# Patient Record
Sex: MALE | Race: BLACK OR AFRICAN AMERICAN | NOT HISPANIC OR LATINO | Employment: OTHER | URBAN - METROPOLITAN AREA
[De-identification: names, ages, dates, MRNs, and addresses within clinical notes are randomized per-mention and may not be internally consistent; named-entity substitution may affect disease eponyms.]

---

## 2020-03-11 ENCOUNTER — OFFICE VISIT (OUTPATIENT)
Dept: UROLOGY | Facility: CLINIC | Age: 75
End: 2020-03-11
Payer: MEDICARE

## 2020-03-11 VITALS
DIASTOLIC BLOOD PRESSURE: 70 MMHG | BODY MASS INDEX: 25.48 KG/M2 | HEIGHT: 69 IN | SYSTOLIC BLOOD PRESSURE: 132 MMHG | WEIGHT: 172 LBS | HEART RATE: 79 BPM

## 2020-03-11 DIAGNOSIS — C61 PROSTATE CANCER: Primary | ICD-10-CM

## 2020-03-11 LAB — PSA, DIAGNOSTIC: <0.014 NG/ML (ref 0–4)

## 2020-03-11 PROCEDURE — 99213 OFFICE O/P EST LOW 20 MIN: CPT | Mod: S$GLB,,, | Performed by: NURSE PRACTITIONER

## 2020-03-11 PROCEDURE — 99213 PR OFFICE/OUTPT VISIT, EST, LEVL III, 20-29 MIN: ICD-10-PCS | Mod: S$GLB,,, | Performed by: NURSE PRACTITIONER

## 2020-03-11 RX ORDER — LISINOPRIL AND HYDROCHLOROTHIAZIDE 20; 25 MG/1; MG/1
TABLET ORAL
COMMUNITY
Start: 2020-02-13 | End: 2022-05-04

## 2020-03-11 RX ORDER — AMLODIPINE BESYLATE 10 MG/1
TABLET ORAL
COMMUNITY
Start: 2020-03-10 | End: 2022-05-04

## 2020-03-11 RX ORDER — SIMVASTATIN 80 MG/1
TABLET, FILM COATED ORAL
COMMUNITY
Start: 2019-12-31 | End: 2022-05-04

## 2020-03-11 NOTE — PROGRESS NOTES
Subjective:       Patient ID: Jayson Diaz is a 74 y.o. male.    Chief Complaint: Other (yearly )      HPI: Thin 74-year-old male known service of Dr. Chery with a history of a distant radical prostatectomy followed by radiation therapy.  His PSAs have been stable at 0.0 with a most recent PSA drawn 2019.  From urologic standpoint he has no complaints.  He is no longer using Tri Mix as his wife passed away 3 years ago.  But states should become involved in the future he would like to resume.  Denies dysuria, frequency, urgency, incontinence, gross hematuria.  No unexplained weight loss or new bone pain       Past Medical History:   Past Medical History:   Diagnosis Date    ED (erectile dysfunction)     Hypertension     Kidney stone     Prostate cancer        Past Surgical Historical:   Past Surgical History:   Procedure Laterality Date    HERNIA REPAIR      RADICAL PROSTATECTOMY          Medications:   Medication List with Changes/Refills   Current Medications    AMLODIPINE (NORVASC) 10 MG TABLET        CLOPIDOGREL BISULFATE (CLOPIDOGREL ORAL)    Take 80 mg by mouth.    LISINOPRIL-HYDROCHLOROTHIAZIDE (PRINZIDE,ZESTORETIC) 20-25 MG TAB    TK 1 T PO D    SIMVASTATIN (ZOCOR) 80 MG TABLET    TK 1 T PO QD        Past Social History:   Social History     Socioeconomic History    Marital status:      Spouse name: Not on file    Number of children: Not on file    Years of education: Not on file    Highest education level: Not on file   Occupational History    Not on file   Social Needs    Financial resource strain: Not on file    Food insecurity:     Worry: Not on file     Inability: Not on file    Transportation needs:     Medical: Not on file     Non-medical: Not on file   Tobacco Use    Smoking status: Current Every Day Smoker     Packs/day: 1.50     Types: Cigarettes   Substance and Sexual Activity    Alcohol use: Not Currently    Drug use: Never    Sexual activity: Not on file   Lifestyle     Physical activity:     Days per week: Not on file     Minutes per session: Not on file    Stress: Not on file   Relationships    Social connections:     Talks on phone: Not on file     Gets together: Not on file     Attends Taoism service: Not on file     Active member of club or organization: Not on file     Attends meetings of clubs or organizations: Not on file     Relationship status: Not on file   Other Topics Concern    Not on file   Social History Narrative    Not on file       Allergies: Review of patient's allergies indicates:  No Known Allergies     Family History: History reviewed. No pertinent family history.     Review of Systems:  Review of Systems   Constitutional: Negative for activity change and appetite change.   HENT: Negative for congestion and dental problem.    Respiratory: Negative for chest tightness and shortness of breath.    Cardiovascular: Negative for chest pain.   Gastrointestinal: Negative for abdominal distention and abdominal pain.   Genitourinary: Negative for decreased urine volume, difficulty urinating, discharge, dysuria, enuresis, flank pain, frequency, genital sores, hematuria, penile pain, penile swelling, scrotal swelling, testicular pain and urgency.   Musculoskeletal: Negative for back pain and neck pain.   Neurological: Negative for dizziness.   Hematological: Negative for adenopathy.   Psychiatric/Behavioral: Negative for agitation, behavioral problems and confusion.       Physical Exam:  Physical Exam   Constitutional: He is oriented to person, place, and time. He appears well-developed and well-nourished.   HENT:   Head: Normocephalic.   Eyes: No scleral icterus.   Neck: Normal range of motion.   Cardiovascular: Intact distal pulses.    Pulmonary/Chest: Effort normal and breath sounds normal.   Abdominal: Soft. He exhibits no distension. There is no tenderness. No hernia. Hernia confirmed negative in the right inguinal area and confirmed negative in the left  inguinal area.   Genitourinary: Testes normal and penis normal. Cremasteric reflex is present.   Neurological: He is alert and oriented to person, place, and time.   Skin: Skin is warm and dry.     Psychiatric: He has a normal mood and affect.          assessment plan;  Prostate cancer--status post distant radical prostatectomy followed by radiation.  PSA has been stable at 0.0 most recent PSA early 2019.  Recheck PSA today    Erectile dysfunction--has use Tri Mix in the past with a successful outcome.  Currently not on medication secondary to not sexually active.

## 2021-03-16 ENCOUNTER — OFFICE VISIT (OUTPATIENT)
Dept: UROLOGY | Facility: CLINIC | Age: 76
End: 2021-03-16
Payer: MEDICARE

## 2021-03-16 VITALS — SYSTOLIC BLOOD PRESSURE: 164 MMHG | HEART RATE: 65 BPM | DIASTOLIC BLOOD PRESSURE: 70 MMHG

## 2021-03-16 DIAGNOSIS — C61 PROSTATE CANCER: Primary | ICD-10-CM

## 2021-03-16 DIAGNOSIS — R31.9 HEMATURIA, UNSPECIFIED TYPE: ICD-10-CM

## 2021-03-16 LAB — PSA, DIAGNOSTIC: <0.014 NG/ML (ref 0–4)

## 2021-03-16 PROCEDURE — 99213 OFFICE O/P EST LOW 20 MIN: CPT | Mod: S$GLB,,, | Performed by: UROLOGY

## 2021-03-16 PROCEDURE — 99213 PR OFFICE/OUTPT VISIT, EST, LEVL III, 20-29 MIN: ICD-10-PCS | Mod: S$GLB,,, | Performed by: UROLOGY

## 2021-03-18 ENCOUNTER — TELEPHONE (OUTPATIENT)
Dept: UROLOGY | Facility: CLINIC | Age: 76
End: 2021-03-18

## 2021-03-24 ENCOUNTER — PROCEDURE VISIT (OUTPATIENT)
Dept: UROLOGY | Facility: CLINIC | Age: 76
End: 2021-03-24
Payer: MEDICARE

## 2021-03-24 VITALS
OXYGEN SATURATION: 98 % | HEIGHT: 69 IN | BODY MASS INDEX: 25.79 KG/M2 | SYSTOLIC BLOOD PRESSURE: 182 MMHG | RESPIRATION RATE: 18 BRPM | DIASTOLIC BLOOD PRESSURE: 79 MMHG | HEART RATE: 77 BPM | WEIGHT: 174.13 LBS

## 2021-03-24 DIAGNOSIS — C61 PROSTATE CANCER: Primary | ICD-10-CM

## 2021-03-24 DIAGNOSIS — R31.9 HEMATURIA, UNSPECIFIED TYPE: ICD-10-CM

## 2021-03-24 PROCEDURE — 52000 CYSTOURETHROSCOPY: CPT | Mod: S$GLB,,, | Performed by: UROLOGY

## 2021-03-24 PROCEDURE — 52000 CYSTOSCOPY: ICD-10-PCS | Mod: S$GLB,,, | Performed by: UROLOGY

## 2021-03-24 RX ORDER — CIPROFLOXACIN 500 MG/1
500 TABLET ORAL
Status: COMPLETED | OUTPATIENT
Start: 2021-03-24 | End: 2021-03-24

## 2021-03-24 RX ADMIN — CIPROFLOXACIN 500 MG: 500 TABLET ORAL at 01:03

## 2021-04-07 ENCOUNTER — OFFICE VISIT (OUTPATIENT)
Dept: UROLOGY | Facility: CLINIC | Age: 76
End: 2021-04-07
Payer: MEDICARE

## 2021-04-07 VITALS — RESPIRATION RATE: 18 BRPM | DIASTOLIC BLOOD PRESSURE: 72 MMHG | SYSTOLIC BLOOD PRESSURE: 164 MMHG | HEART RATE: 62 BPM

## 2021-04-07 DIAGNOSIS — R31.29 HEMATURIA, MICROSCOPIC: ICD-10-CM

## 2021-04-07 DIAGNOSIS — C61 PROSTATE CANCER: Primary | ICD-10-CM

## 2021-04-07 DIAGNOSIS — N30.40 RADIATION CYSTITIS: ICD-10-CM

## 2021-04-07 PROCEDURE — 99213 PR OFFICE/OUTPT VISIT, EST, LEVL III, 20-29 MIN: ICD-10-PCS | Mod: S$GLB,,, | Performed by: NURSE PRACTITIONER

## 2021-04-07 PROCEDURE — 99213 OFFICE O/P EST LOW 20 MIN: CPT | Mod: S$GLB,,, | Performed by: NURSE PRACTITIONER

## 2022-01-19 ENCOUNTER — HOSPITAL ENCOUNTER (OUTPATIENT)
Dept: MEDSURG UNIT | Facility: HOSPITAL | Age: 77
End: 2022-01-21
Attending: INTERNAL MEDICINE | Admitting: INTERNAL MEDICINE

## 2022-01-19 LAB — CREAT SERPL-MCNC: 2.22 MG/DL (ref 0.73–1.18)

## 2022-01-20 LAB
ABS NEUT (OLG): 4.23 X10(3)/MCL (ref 2.1–9.2)
ABS NEUT (OLG): 5.51 X10(3)/MCL (ref 2.1–9.2)
ALBUMIN SERPL-MCNC: 3.3 GM/DL (ref 3.4–4.8)
ALBUMIN/GLOB SERPL: 1.5 RATIO (ref 1.1–2)
ALP SERPL-CCNC: 68 UNIT/L (ref 40–150)
ALT SERPL-CCNC: 10 UNIT/L (ref 0–55)
AST SERPL-CCNC: 13 UNIT/L (ref 5–34)
BASOPHILS # BLD AUTO: 0 X10(3)/MCL (ref 0–0.2)
BASOPHILS # BLD AUTO: 0 X10(3)/MCL (ref 0–0.2)
BASOPHILS NFR BLD AUTO: 0 %
BASOPHILS NFR BLD AUTO: 0 %
BILIRUB SERPL-MCNC: 0.4 MG/DL
BILIRUBIN DIRECT+TOT PNL SERPL-MCNC: 0.2 MG/DL (ref 0–0.5)
BILIRUBIN DIRECT+TOT PNL SERPL-MCNC: 0.2 MG/DL (ref 0–0.8)
BUN SERPL-MCNC: 34.9 MG/DL (ref 8.4–25.7)
CALCIUM SERPL-MCNC: 8.5 MG/DL (ref 8.7–10.5)
CHLORIDE SERPL-SCNC: 109 MMOL/L (ref 98–107)
CO2 SERPL-SCNC: 22 MMOL/L (ref 23–31)
CREAT SERPL-MCNC: 2.12 MG/DL (ref 0.73–1.18)
EOSINOPHIL # BLD AUTO: 0.2 X10(3)/MCL (ref 0–0.9)
EOSINOPHIL # BLD AUTO: 0.2 X10(3)/MCL (ref 0–0.9)
EOSINOPHIL NFR BLD AUTO: 2 %
EOSINOPHIL NFR BLD AUTO: 3 %
ERYTHROCYTE [DISTWIDTH] IN BLOOD BY AUTOMATED COUNT: 13.2 % (ref 11.5–17)
ERYTHROCYTE [DISTWIDTH] IN BLOOD BY AUTOMATED COUNT: 13.4 % (ref 11.5–17)
GLOBULIN SER-MCNC: 2.2 GM/DL (ref 2.4–3.5)
GLUCOSE SERPL-MCNC: 172 MG/DL (ref 82–115)
HCT VFR BLD AUTO: 31.6 % (ref 42–52)
HCT VFR BLD AUTO: 31.7 % (ref 42–52)
HGB BLD-MCNC: 10 GM/DL (ref 14–18)
HGB BLD-MCNC: 10.2 GM/DL (ref 14–18)
LYMPHOCYTES # BLD AUTO: 0.9 X10(3)/MCL (ref 0.6–4.6)
LYMPHOCYTES # BLD AUTO: 1 X10(3)/MCL (ref 0.6–4.6)
LYMPHOCYTES NFR BLD AUTO: 13 %
LYMPHOCYTES NFR BLD AUTO: 16 %
MCH RBC QN AUTO: 30.9 PG (ref 27–31)
MCH RBC QN AUTO: 31.1 PG (ref 27–31)
MCHC RBC AUTO-ENTMCNC: 31.5 GM/DL (ref 33–36)
MCHC RBC AUTO-ENTMCNC: 32.3 GM/DL (ref 33–36)
MCV RBC AUTO: 96.3 FL (ref 80–94)
MCV RBC AUTO: 97.8 FL (ref 80–94)
MONOCYTES # BLD AUTO: 0.7 X10(3)/MCL (ref 0.1–1.3)
MONOCYTES # BLD AUTO: 0.8 X10(3)/MCL (ref 0.1–1.3)
MONOCYTES NFR BLD AUTO: 12 %
MONOCYTES NFR BLD AUTO: 9 %
NEUTROPHILS # BLD AUTO: 4.23 X10(3)/MCL (ref 2.1–9.2)
NEUTROPHILS # BLD AUTO: 5.51 X10(3)/MCL (ref 2.1–9.2)
NEUTROPHILS NFR BLD AUTO: 68 %
NEUTROPHILS NFR BLD AUTO: 75 %
PLATELET # BLD AUTO: 162 X10(3)/MCL (ref 130–400)
PLATELET # BLD AUTO: 174 X10(3)/MCL (ref 130–400)
PMV BLD AUTO: 10.3 FL (ref 9.4–12.4)
PMV BLD AUTO: 9.9 FL (ref 9.4–12.4)
POTASSIUM SERPL-SCNC: 4.4 MMOL/L (ref 3.5–5.1)
PROT SERPL-MCNC: 5.5 GM/DL (ref 5.8–7.6)
RBC # BLD AUTO: 3.24 X10(6)/MCL (ref 4.7–6.1)
RBC # BLD AUTO: 3.28 X10(6)/MCL (ref 4.7–6.1)
SODIUM SERPL-SCNC: 138 MMOL/L (ref 136–145)
WBC # SPEC AUTO: 6.2 X10(3)/MCL (ref 4.5–11.5)
WBC # SPEC AUTO: 7.4 X10(3)/MCL (ref 4.5–11.5)

## 2022-05-04 ENCOUNTER — OFFICE VISIT (OUTPATIENT)
Dept: UROLOGY | Facility: CLINIC | Age: 77
End: 2022-05-04
Payer: MEDICARE

## 2022-05-04 VITALS
WEIGHT: 184 LBS | SYSTOLIC BLOOD PRESSURE: 143 MMHG | HEART RATE: 68 BPM | HEIGHT: 69 IN | DIASTOLIC BLOOD PRESSURE: 75 MMHG | BODY MASS INDEX: 27.25 KG/M2

## 2022-05-04 DIAGNOSIS — C61 PROSTATE CANCER: Primary | ICD-10-CM

## 2022-05-04 LAB — PSA, DIAGNOSTIC: <0.014 NG/ML (ref 0–4)

## 2022-05-04 PROCEDURE — 99214 PR OFFICE/OUTPT VISIT, EST, LEVL IV, 30-39 MIN: ICD-10-PCS | Mod: S$GLB,,, | Performed by: NURSE PRACTITIONER

## 2022-05-04 PROCEDURE — 99214 OFFICE O/P EST MOD 30 MIN: CPT | Mod: S$GLB,,, | Performed by: NURSE PRACTITIONER

## 2022-05-04 RX ORDER — LABETALOL 200 MG/1
400 TABLET, FILM COATED ORAL 2 TIMES DAILY
COMMUNITY
Start: 2022-04-02

## 2022-05-04 RX ORDER — TRIAMCINOLONE ACETONIDE 1 MG/G
CREAM TOPICAL
COMMUNITY
Start: 2022-04-07

## 2022-05-04 RX ORDER — ASPIRIN 81 MG/1
81 TABLET ORAL
COMMUNITY
Start: 2022-01-19

## 2022-05-04 RX ORDER — OMEPRAZOLE 40 MG/1
CAPSULE, DELAYED RELEASE ORAL
COMMUNITY
Start: 2022-02-21

## 2022-05-04 RX ORDER — CLONIDINE HYDROCHLORIDE 0.1 MG/1
TABLET ORAL
COMMUNITY
Start: 2022-05-02

## 2022-05-04 RX ORDER — ATORVASTATIN CALCIUM 80 MG/1
80 TABLET, FILM COATED ORAL DAILY
COMMUNITY
Start: 2022-02-26

## 2022-05-04 RX ORDER — LISINOPRIL 10 MG/1
10 TABLET ORAL EVERY MORNING
COMMUNITY
Start: 2022-03-30

## 2022-05-04 NOTE — PROGRESS NOTES
Subjective:       Patient ID: Jayson Diaz is a 76 y.o. male.    Chief Complaint: Prostate Cancer      HPI: 76-year-old male known to the service past patient Dr. Chery history of prostate cancer remotely status post prostatectomy with follow-up XRT.  Last year in March of 2021 he was on blood thinners had gross hematuria.  He underwent cystoscopy that time identifying findings compatible with radiation cystitis.  Patient came off the blood thinners and has not seen any further bleeding.  He is voiding without complaint.  Denies any urologic issues at present.  He does have a history of erectile dysfunction is use Tri Mix in the past.  Has been approximately 5 years since his last use.  His wife passed away at that time.  He questions today if he can restart the medicine should the need arise.  PSA has been stable at 0.0       Past Medical History:   Past Medical History:   Diagnosis Date    ED (erectile dysfunction)     Hypertension     Kidney stone     Prostate cancer        Past Surgical Historical:   Past Surgical History:   Procedure Laterality Date    HERNIA REPAIR      RADICAL PROSTATECTOMY          Medications:   Medication List with Changes/Refills   Current Medications    ASPIRIN (ECOTRIN) 81 MG EC TABLET    Take 81 mg by mouth.    ATORVASTATIN (LIPITOR) 80 MG TABLET    Take 80 mg by mouth once daily.    CLONIDINE (CATAPRES) 0.1 MG TABLET    TAKE 1 TABLET BY MOUTH TWICE DAILY AS NEEDED FOR BLOOD PRESSURE GREATER THAN 170/100    LABETALOL (NORMODYNE) 200 MG TABLET    Take 400 mg by mouth 2 (two) times daily.    LISINOPRIL 10 MG TABLET    Take 10 mg by mouth every morning.    OMEPRAZOLE (PRILOSEC) 40 MG CAPSULE    TAKE 1 CAPSULE BY MOUTH EVERY MORNING FOR REFLUX    TRIAMCINOLONE ACETONIDE 0.1% (KENALOG) 0.1 % CREAM    APPLY SMALL AMOUNT TOPICALLY TO THE AFFECTED AREA EVERY DAY AS NEEDED FOR ITCHING   Discontinued Medications    AMLODIPINE (NORVASC) 10 MG TABLET        CLOPIDOGREL BISULFATE  (CLOPIDOGREL ORAL)    Take 80 mg by mouth.    LISINOPRIL-HYDROCHLOROTHIAZIDE (PRINZIDE,ZESTORETIC) 20-25 MG TAB    TK 1 T PO D    SIMVASTATIN (ZOCOR) 80 MG TABLET    TK 1 T PO QD        Past Social History:   Social History     Socioeconomic History    Marital status:    Tobacco Use    Smoking status: Current Every Day Smoker     Packs/day: 1.50     Types: Cigarettes   Substance and Sexual Activity    Alcohol use: Not Currently    Drug use: Never       Allergies: Review of patient's allergies indicates:  No Known Allergies     Family History: History reviewed. No pertinent family history.     Review of Systems:  Review of Systems   Constitutional: Negative for activity change and appetite change.   HENT: Negative for congestion and dental problem.    Eyes: Negative for visual disturbance.   Respiratory: Negative for chest tightness and shortness of breath.    Cardiovascular: Negative for chest pain.   Gastrointestinal: Negative for abdominal distention and abdominal pain.   Genitourinary: Negative for decreased urine volume, difficulty urinating, dysuria, enuresis, flank pain, frequency, genital sores, hematuria, penile discharge, penile pain, penile swelling, scrotal swelling, testicular pain and urgency.   Musculoskeletal: Negative for back pain and neck pain.   Skin: Negative for color change.   Neurological: Negative for dizziness.   Hematological: Negative for adenopathy.   Psychiatric/Behavioral: Negative for agitation, behavioral problems and confusion.       Physical Exam:  Physical Exam  Constitutional:       Appearance: He is well-developed.   HENT:      Head: Normocephalic.   Eyes:      General: No scleral icterus.  Pulmonary:      Effort: Pulmonary effort is normal.      Breath sounds: Normal breath sounds.   Abdominal:      General: There is no distension.      Palpations: Abdomen is soft.      Tenderness: There is no abdominal tenderness.      Hernia: No hernia is present. There is no hernia  in the right inguinal area or left inguinal area.   Genitourinary:     Penis: Normal.       Testes: Normal. Cremasteric reflex is present.   Musculoskeletal:      Cervical back: Normal range of motion.   Skin:     General: Skin is warm and dry.   Neurological:      Mental Status: He is alert and oriented to person, place, and time.         Assessment/Plan:   Prostate cancer--urinalysis negative.  Serum PSA today    History radiation cystitis--no recurrence of gross hematuria status post cessation of anticoagulants    Erectile dysfunction--patient would like to restart Tri Mix in the future should the need arise I told him at that time this give me a call and will send a prescription in he verbalized understanding of same he did not want script today.  Problem List Items Addressed This Visit    None

## 2022-05-05 ENCOUNTER — TELEPHONE (OUTPATIENT)
Dept: UROLOGY | Facility: CLINIC | Age: 77
End: 2022-05-05
Payer: MEDICARE

## 2022-05-05 NOTE — TELEPHONE ENCOUNTER
Patient notified of lab results. [facility] LPN----- Message from Chadwick Venegas NP sent at 5/5/2022  7:37 AM CDT -----  Please notify patient of lab results within stable range

## 2023-05-04 ENCOUNTER — OFFICE VISIT (OUTPATIENT)
Dept: UROLOGY | Facility: CLINIC | Age: 78
End: 2023-05-04
Payer: MEDICARE

## 2023-05-04 VITALS — SYSTOLIC BLOOD PRESSURE: 197 MMHG | HEART RATE: 67 BPM | DIASTOLIC BLOOD PRESSURE: 88 MMHG

## 2023-05-04 DIAGNOSIS — R39.15 URINARY URGENCY: ICD-10-CM

## 2023-05-04 DIAGNOSIS — C61 PROSTATE CANCER: Primary | ICD-10-CM

## 2023-05-04 LAB — PSA, DIAGNOSTIC: < 0.01 NG/ML (ref 0.1–4)

## 2023-05-04 PROCEDURE — 99214 OFFICE O/P EST MOD 30 MIN: CPT | Mod: S$GLB,,, | Performed by: UROLOGY

## 2023-05-04 PROCEDURE — 99214 PR OFFICE/OUTPT VISIT, EST, LEVL IV, 30-39 MIN: ICD-10-PCS | Mod: S$GLB,,, | Performed by: UROLOGY

## 2023-05-04 RX ORDER — SOLIFENACIN SUCCINATE 5 MG/1
5 TABLET, FILM COATED ORAL DAILY
Qty: 30 TABLET | Refills: 11 | Status: SHIPPED | OUTPATIENT
Start: 2023-05-04 | End: 2024-05-03

## 2023-05-04 RX ORDER — AMLODIPINE BESYLATE 10 MG/1
TABLET ORAL
COMMUNITY

## 2023-05-04 RX ORDER — GLIMEPIRIDE 1 MG/1
TABLET ORAL
COMMUNITY

## 2023-05-04 RX ORDER — EZETIMIBE 10 MG/1
10 TABLET ORAL
COMMUNITY
Start: 2023-02-27

## 2023-05-04 NOTE — PROGRESS NOTES
Subjective:       Patient ID: Jayson Diaz is a 77 y.o. male.    Chief Complaint: No chief complaint on file.      HPI:  77-year-old male had a prostatectomy almost 30 years ago PSAs undetectable he did have salvage radiation at some point he reports urinary urgency and frequency with nocturia never had any overactive bladder meds.  PSA remains undetectable    Past Medical History:   Past Medical History:   Diagnosis Date    ED (erectile dysfunction)     Hypertension     Kidney stone     Prostate cancer        Past Surgical Historical:   Past Surgical History:   Procedure Laterality Date    HERNIA REPAIR      RADICAL PROSTATECTOMY          Medications:   Medication List with Changes/Refills   New Medications    SOLIFENACIN (VESICARE) 5 MG TABLET    Take 1 tablet (5 mg total) by mouth once daily.   Current Medications    AMLODIPINE (NORVASC) 10 MG TABLET    1 tablet Orally qhs    ASPIRIN (ECOTRIN) 81 MG EC TABLET    Take 81 mg by mouth.    ATORVASTATIN (LIPITOR) 80 MG TABLET    Take 80 mg by mouth once daily.    CLONIDINE (CATAPRES) 0.1 MG TABLET    TAKE 1 TABLET BY MOUTH TWICE DAILY AS NEEDED FOR BLOOD PRESSURE GREATER THAN 170/100    EZETIMIBE (ZETIA) 10 MG TABLET    Take 10 mg by mouth.    GLIMEPIRIDE (AMARYL) 1 MG TABLET    1 tablet with breakfast or the first main meal of the day Orally Once a day    LABETALOL (NORMODYNE) 200 MG TABLET    Take 400 mg by mouth 2 (two) times daily.    LISINOPRIL 10 MG TABLET    Take 10 mg by mouth every morning.    OMEPRAZOLE (PRILOSEC) 40 MG CAPSULE    TAKE 1 CAPSULE BY MOUTH EVERY MORNING FOR REFLUX    TRIAMCINOLONE ACETONIDE 0.1% (KENALOG) 0.1 % CREAM    APPLY SMALL AMOUNT TOPICALLY TO THE AFFECTED AREA EVERY DAY AS NEEDED FOR ITCHING        Past Social History:   Social History     Socioeconomic History    Marital status:    Tobacco Use    Smoking status: Former     Packs/day: 1.50     Types: Cigarettes   Substance and Sexual Activity    Alcohol use: Not Currently     Drug use: Never       Allergies: Review of patient's allergies indicates:  No Known Allergies     Family History: History reviewed. No pertinent family history.     Review of Systems:  Review of Systems   Constitutional:  Negative for activity change and appetite change.   HENT:  Negative for congestion and dental problem.    Eyes:  Negative for visual disturbance.   Respiratory:  Negative for chest tightness and shortness of breath.    Cardiovascular:  Negative for chest pain.   Gastrointestinal:  Negative for abdominal distention and abdominal pain.   Endocrine: Negative for polyuria.   Genitourinary:  Negative for difficulty urinating, dysuria, flank pain, frequency, hematuria, penile discharge, penile pain, penile swelling, scrotal swelling, testicular pain and urgency.   Musculoskeletal:  Negative for back pain and neck pain.   Skin:  Negative for color change.   Allergic/Immunologic: Positive for immunocompromised state.   Neurological:  Negative for dizziness.   Hematological:  Negative for adenopathy.   Psychiatric/Behavioral:  Negative for agitation, behavioral problems and confusion.      Physical Exam:  Physical Exam  Constitutional:       General: He is not in acute distress.     Appearance: He is well-developed.   HENT:      Head: Normocephalic and atraumatic.      Nose: Nose normal.   Eyes:      General: No scleral icterus.     Conjunctiva/sclera: Conjunctivae normal.      Pupils: Pupils are equal, round, and reactive to light.   Neck:      Thyroid: No thyromegaly.      Trachea: No tracheal deviation.   Cardiovascular:      Rate and Rhythm: Normal rate and regular rhythm.      Heart sounds: Normal heart sounds.   Pulmonary:      Effort: Pulmonary effort is normal. No respiratory distress.      Breath sounds: Normal breath sounds. No wheezing or rales.   Abdominal:      General: Bowel sounds are normal. There is no distension.      Palpations: Abdomen is soft.      Tenderness: There is no abdominal  tenderness. There is no guarding or rebound.   Genitourinary:     Penis: Normal. No tenderness.       Prostate: Normal.   Musculoskeletal:         General: No deformity. Normal range of motion.      Cervical back: Neck supple.   Lymphadenopathy:      Cervical: No cervical adenopathy.   Skin:     General: Skin is warm and dry.      Findings: No erythema or rash.   Neurological:      Mental Status: He is alert and oriented to person, place, and time.      Cranial Nerves: No cranial nerve deficit.   Psychiatric:         Behavior: Behavior normal.       Assessment/Plan:       Problem List Items Addressed This Visit    None  Visit Diagnoses       Prostate cancer    -  Primary    Relevant Orders    Prostate Specific Antigen, Diagnostic    Urinary urgency                   Urinary urgency and frequency:  We will try 5 mg VESIcare    Prostate cancer:  Patient is on yearly follow-up his PSA is undetectable return to clinic in 1 year

## 2023-05-05 ENCOUNTER — TELEPHONE (OUTPATIENT)
Dept: UROLOGY | Facility: CLINIC | Age: 78
End: 2023-05-05
Payer: MEDICARE

## 2023-05-05 NOTE — TELEPHONE ENCOUNTER
----- Message from Ammy Bach NP sent at 5/5/2023  9:59 AM CDT -----  Please notify the patient of PSA results being undetectable.  Keep next scheduled appointment.

## 2024-04-16 ENCOUNTER — TELEPHONE (OUTPATIENT)
Dept: TRANSPLANT | Facility: CLINIC | Age: 79
End: 2024-04-16
Payer: MEDICARE

## 2024-04-22 ENCOUNTER — TELEPHONE (OUTPATIENT)
Dept: TRANSPLANT | Facility: CLINIC | Age: 79
End: 2024-04-22
Payer: MEDICARE

## 2024-04-22 NOTE — TELEPHONE ENCOUNTER
Returned phone call, no answer. Voicemail message to return call to Transplant Dept    ----- Message from Yesi Taylor MD sent at 4/22/2024  3:27 PM CDT -----  Regarding: RE: Consult/Advisory  Contact: Jayson and Taisha  Include age + PVD and limited functional capacity.  The hx cancer and HTN is not deal breaker by itself. XRT could be but don't have enough info, so would leave out.  ----- Message -----  From: Massiel Holliday, RN  Sent: 4/22/2024  12:33 PM CDT  To: Yesi Taylor MD; #  Subject: FW: Consult/Advisory                             Good afternoon,  I am reaching out for some guidance on how to handle this call. The patient indeed was denied due to age due to protocol. However, because of age, Kevyn was just able to send the denial on her part due to his age. However, I have looked thru [facility] and it seems despite having a donor, Jeff [facility]chant denied him April 1, 2024 due to Vascular disease. 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 She I just inform her our denial was not only due to age but he co morbid conditions as well?    Thank you   Maureen  ----- Message -----  From: Rosa Tian  Sent: 4/22/2024  12:04 PM CDT  To: [facility]Kid[facility] Clinical  Subject: Consult/Advisory                                 Consult/Advisory     Name Of Caller:Sunni AGUIRRE Joe          Contact Preference:[phone]     Nature of call:Pt  and his daughter are calling to see if he was denied to be on the Kidney waitlist, strictly because of his age, and were the staff aware that was a live donor for the pt. Please advise. Requesting a call back.

## 2024-04-22 NOTE — TELEPHONE ENCOUNTER
Returned phone call and spoke with daughter at length regarding patient denial, daughter fully understands and thankful for detailed information.    ----- Message from Re Ventura sent at 4/22/2024  4:56 PM CDT -----  Regarding: Appt  Contact: Mariya   [phone]      Caller:  Mariya pt's daughter      Returning call to: Radha      Caller can be reached at: [phone]    Nature of the call: Returning missed call

## 2024-04-30 ENCOUNTER — CLINICAL SUPPORT (OUTPATIENT)
Dept: UROLOGY | Facility: CLINIC | Age: 79
End: 2024-04-30
Payer: MEDICARE

## 2024-04-30 DIAGNOSIS — C61 PROSTATE CANCER: Primary | ICD-10-CM

## 2024-04-30 LAB — PSA, DIAGNOSTIC: < 0.01 NG/ML (ref 0.1–4)

## 2024-04-30 NOTE — PROGRESS NOTES
Pt came in today for a psa blood draw, I took blood from the left ar,. Pt tolerated well, and is aware of his f/u apt.

## 2024-05-07 ENCOUNTER — OFFICE VISIT (OUTPATIENT)
Dept: UROLOGY | Facility: CLINIC | Age: 79
End: 2024-05-07
Payer: MEDICARE

## 2024-05-07 VITALS
HEIGHT: 69 IN | BODY MASS INDEX: 27.26 KG/M2 | SYSTOLIC BLOOD PRESSURE: 197 MMHG | HEART RATE: 65 BPM | WEIGHT: 184.06 LBS | DIASTOLIC BLOOD PRESSURE: 91 MMHG

## 2024-05-07 DIAGNOSIS — R39.15 URINARY URGENCY: Primary | ICD-10-CM

## 2024-05-07 PROCEDURE — 99214 OFFICE O/P EST MOD 30 MIN: CPT | Mod: S$GLB,,, | Performed by: UROLOGY

## 2024-05-07 RX ORDER — SOLIFENACIN SUCCINATE 5 MG/1
5 TABLET, FILM COATED ORAL DAILY
Qty: 90 TABLET | Refills: 3 | Status: SHIPPED | OUTPATIENT
Start: 2024-05-07 | End: 2025-05-07

## 2024-05-07 NOTE — PROGRESS NOTES
Subjective:       Patient ID: Jayson Diaz is a 78 y.o. male.    Chief Complaint: No chief complaint on file.      HPI:  78-year-old male had a prostatectomy almost 31 years ago PSAs undetectable. He did have salvage radiation at some point. He reports urinary urgency and frequency with nocturia currently managed with vesicare. PSA remains undetectable    Past Medical History:   Past Medical History:   Diagnosis Date    ED (erectile dysfunction)     Hypertension     Kidney stone     Prostate cancer        Past Surgical Historical:   Past Surgical History:   Procedure Laterality Date    HERNIA REPAIR      RADICAL PROSTATECTOMY          Medications:   Medication List with Changes/Refills   Current Medications    AMLODIPINE (NORVASC) 10 MG TABLET    1 tablet Orally qhs    ASPIRIN (ECOTRIN) 81 MG EC TABLET    Take 81 mg by mouth.    ATORVASTATIN (LIPITOR) 80 MG TABLET    Take 80 mg by mouth once daily.    CLONIDINE (CATAPRES) 0.1 MG TABLET    Take 0.3 mg by mouth once.    EZETIMIBE (ZETIA) 10 MG TABLET    Take 10 mg by mouth.    GLIMEPIRIDE (AMARYL) 1 MG TABLET    1 tablet with breakfast or the first main meal of the day Orally Once a day    LABETALOL (NORMODYNE) 200 MG TABLET    Take 400 mg by mouth 2 (two) times daily.    LISINOPRIL 10 MG TABLET    Take 10 mg by mouth every morning.    OMEPRAZOLE (PRILOSEC) 40 MG CAPSULE    TAKE 1 CAPSULE BY MOUTH EVERY MORNING FOR REFLUX    SOLIFENACIN (VESICARE) 5 MG TABLET    Take 1 tablet (5 mg total) by mouth once daily.    TRIAMCINOLONE ACETONIDE 0.1% (KENALOG) 0.1 % CREAM    APPLY SMALL AMOUNT TOPICALLY TO THE AFFECTED AREA EVERY DAY AS NEEDED FOR ITCHING        Past Social History:   Social History     Socioeconomic History    Marital status:    Tobacco Use    Smoking status: Former     Current packs/day: 1.50     Types: Cigarettes   Substance and Sexual Activity    Alcohol use: Not Currently    Drug use: Never       Allergies: Review of patient's allergies  indicates:  No Known Allergies     Family History: No family history on file.     Review of Systems:  Review of Systems   Constitutional:  Negative for activity change and appetite change.   HENT:  Negative for congestion and dental problem.    Eyes:  Negative for visual disturbance.   Respiratory:  Negative for chest tightness and shortness of breath.    Cardiovascular:  Negative for chest pain.   Gastrointestinal:  Negative for abdominal distention and abdominal pain.   Endocrine: Negative for polyuria.   Genitourinary:  Negative for difficulty urinating, dysuria, flank pain, frequency, hematuria, penile discharge, penile pain, penile swelling, scrotal swelling, testicular pain and urgency.   Musculoskeletal:  Negative for back pain and neck pain.   Skin:  Negative for color change.   Allergic/Immunologic: Positive for immunocompromised state.   Neurological:  Negative for dizziness.   Hematological:  Negative for adenopathy.   Psychiatric/Behavioral:  Negative for agitation, behavioral problems and confusion.        Physical Exam:  Physical Exam  Constitutional:       General: He is not in acute distress.     Appearance: He is well-developed.   HENT:      Head: Normocephalic and atraumatic.      Nose: Nose normal.   Eyes:      General: No scleral icterus.     Conjunctiva/sclera: Conjunctivae normal.      Pupils: Pupils are equal, round, and reactive to light.   Neck:      Thyroid: No thyromegaly.      Trachea: No tracheal deviation.   Cardiovascular:      Rate and Rhythm: Normal rate and regular rhythm.      Heart sounds: Normal heart sounds.   Pulmonary:      Effort: Pulmonary effort is normal. No respiratory distress.      Breath sounds: Normal breath sounds. No wheezing or rales.   Abdominal:      General: Bowel sounds are normal. There is no distension.      Palpations: Abdomen is soft.      Tenderness: There is no abdominal tenderness. There is no guarding or rebound.   Genitourinary:     Penis: Normal. No  tenderness.       Prostate: Normal.   Musculoskeletal:         General: No deformity. Normal range of motion.      Cervical back: Neck supple.   Lymphadenopathy:      Cervical: No cervical adenopathy.   Skin:     General: Skin is warm and dry.      Findings: No erythema or rash.   Neurological:      Mental Status: He is alert and oriented to person, place, and time.      Cranial Nerves: No cranial nerve deficit.   Psychiatric:         Behavior: Behavior normal.         Assessment/Plan:     Urinary urgency and frequency:  We will refill 5 mg VESIcare    Prostate cancer:  Patient is on yearly follow-up his PSA is undetectable return to clinic in 1 year    I, Dr. Elías Coley have seen and personally evaluated the patient. I have formulated the plan reviewed all pertinent imaging and clinical data.  I agree with the nurse practitioner's assessment, and I have personally formulated the plan for this patient's care as described by the midlevel.    Problem List Items Addressed This Visit    None             HPI

## 2024-09-06 ENCOUNTER — DOCUMENTATION ONLY (OUTPATIENT)
Dept: NEPHROLOGY | Facility: HOSPITAL | Age: 79
End: 2024-09-06
Payer: MEDICARE

## 2024-09-06 ENCOUNTER — HOSPITAL ENCOUNTER (OUTPATIENT)
Facility: HOSPITAL | Age: 79
Discharge: HOME | End: 2024-09-06
Attending: INTERNAL MEDICINE | Admitting: INTERNAL MEDICINE
Payer: MEDICARE

## 2024-09-06 VITALS
DIASTOLIC BLOOD PRESSURE: 95 MMHG | OXYGEN SATURATION: 97 % | TEMPERATURE: 97 F | HEIGHT: 69 IN | SYSTOLIC BLOOD PRESSURE: 160 MMHG | BODY MASS INDEX: 24.36 KG/M2 | WEIGHT: 164.44 LBS | HEART RATE: 68 BPM | RESPIRATION RATE: 17 BRPM

## 2024-09-06 DIAGNOSIS — I21.4 ACUTE MYOCARDIAL INFARCTION, SUBENDOCARDIAL INFARCTION, INITIAL EPISODE OF CARE: ICD-10-CM

## 2024-09-06 DIAGNOSIS — R07.9 CHEST PAIN: ICD-10-CM

## 2024-09-06 LAB
HBV SURFACE AG SERPL QL IA: NONREACTIVE
POCT GLUCOSE: 192 MG/DL (ref 70–110)

## 2024-09-06 PROCEDURE — C1769 GUIDE WIRE: HCPCS | Performed by: INTERNAL MEDICINE

## 2024-09-06 PROCEDURE — 36252 INS CATH REN ART 1ST BILAT: CPT | Performed by: INTERNAL MEDICINE

## 2024-09-06 PROCEDURE — G0257 UNSCHED DIALYSIS ESRD PT HOS: HCPCS

## 2024-09-06 PROCEDURE — 99153 MOD SED SAME PHYS/QHP EA: CPT | Performed by: INTERNAL MEDICINE

## 2024-09-06 PROCEDURE — 99152 MOD SED SAME PHYS/QHP 5/>YRS: CPT | Performed by: INTERNAL MEDICINE

## 2024-09-06 PROCEDURE — C1894 INTRO/SHEATH, NON-LASER: HCPCS | Performed by: INTERNAL MEDICINE

## 2024-09-06 PROCEDURE — 25000003 PHARM REV CODE 250: Performed by: INTERNAL MEDICINE

## 2024-09-06 PROCEDURE — 87340 HEPATITIS B SURFACE AG IA: CPT | Performed by: INTERNAL MEDICINE

## 2024-09-06 PROCEDURE — 27201423 OPTIME MED/SURG SUP & DEVICES STERILE SUPPLY: Performed by: INTERNAL MEDICINE

## 2024-09-06 PROCEDURE — 63600175 PHARM REV CODE 636 W HCPCS: Performed by: INTERNAL MEDICINE

## 2024-09-06 PROCEDURE — 93458 L HRT ARTERY/VENTRICLE ANGIO: CPT | Performed by: INTERNAL MEDICINE

## 2024-09-06 PROCEDURE — 25500020 PHARM REV CODE 255: Performed by: INTERNAL MEDICINE

## 2024-09-06 PROCEDURE — C1887 CATHETER, GUIDING: HCPCS | Performed by: INTERNAL MEDICINE

## 2024-09-06 PROCEDURE — 80100016 HC MAINTENANCE HEMODIALYSIS

## 2024-09-06 RX ORDER — NITROGLYCERIN 20 MG/100ML
INJECTION INTRAVENOUS
Status: DISCONTINUED | OUTPATIENT
Start: 2024-09-06 | End: 2024-09-06 | Stop reason: HOSPADM

## 2024-09-06 RX ORDER — MUPIROCIN 20 MG/G
OINTMENT TOPICAL 2 TIMES DAILY
OUTPATIENT
Start: 2024-09-06 | End: 2024-09-11

## 2024-09-06 RX ORDER — HEPARIN SODIUM 1000 [USP'U]/ML
INJECTION, SOLUTION INTRAVENOUS; SUBCUTANEOUS
Status: DISCONTINUED | OUTPATIENT
Start: 2024-09-06 | End: 2024-09-06 | Stop reason: HOSPADM

## 2024-09-06 RX ORDER — MORPHINE SULFATE 4 MG/ML
2 INJECTION, SOLUTION INTRAMUSCULAR; INTRAVENOUS EVERY 4 HOURS PRN
Status: DISCONTINUED | OUTPATIENT
Start: 2024-09-06 | End: 2024-09-06 | Stop reason: HOSPADM

## 2024-09-06 RX ORDER — IOPAMIDOL 755 MG/ML
INJECTION, SOLUTION INTRAVASCULAR
Status: DISCONTINUED | OUTPATIENT
Start: 2024-09-06 | End: 2024-09-06 | Stop reason: HOSPADM

## 2024-09-06 RX ORDER — MIDAZOLAM HYDROCHLORIDE 1 MG/ML
INJECTION, SOLUTION INTRAMUSCULAR; INTRAVENOUS
Status: DISCONTINUED | OUTPATIENT
Start: 2024-09-06 | End: 2024-09-06 | Stop reason: HOSPADM

## 2024-09-06 RX ORDER — ACETAMINOPHEN 325 MG/1
650 TABLET ORAL EVERY 4 HOURS PRN
Status: DISCONTINUED | OUTPATIENT
Start: 2024-09-06 | End: 2024-09-06 | Stop reason: HOSPADM

## 2024-09-06 RX ORDER — CLONIDINE HYDROCHLORIDE 0.2 MG/1
0.2 TABLET ORAL
COMMUNITY

## 2024-09-06 RX ORDER — DIAZEPAM 5 MG/1
10 TABLET ORAL
Status: COMPLETED | OUTPATIENT
Start: 2024-09-06 | End: 2024-09-06

## 2024-09-06 RX ORDER — VERAPAMIL HYDROCHLORIDE 2.5 MG/ML
INJECTION, SOLUTION INTRAVENOUS
Status: DISCONTINUED | OUTPATIENT
Start: 2024-09-06 | End: 2024-09-06 | Stop reason: HOSPADM

## 2024-09-06 RX ORDER — SODIUM CHLORIDE 9 MG/ML
INJECTION, SOLUTION INTRAVENOUS ONCE
OUTPATIENT
Start: 2024-09-06 | End: 2024-09-06

## 2024-09-06 RX ORDER — HYDRALAZINE HYDROCHLORIDE 20 MG/ML
INJECTION INTRAMUSCULAR; INTRAVENOUS
Status: DISCONTINUED | OUTPATIENT
Start: 2024-09-06 | End: 2024-09-06 | Stop reason: HOSPADM

## 2024-09-06 RX ORDER — LIDOCAINE HYDROCHLORIDE 10 MG/ML
INJECTION, SOLUTION EPIDURAL; INFILTRATION; INTRACAUDAL; PERINEURAL
Status: DISCONTINUED | OUTPATIENT
Start: 2024-09-06 | End: 2024-09-06 | Stop reason: HOSPADM

## 2024-09-06 RX ORDER — HYDRALAZINE HYDROCHLORIDE 50 MG/1
50 TABLET, FILM COATED ORAL ONCE AS NEEDED
COMMUNITY

## 2024-09-06 RX ORDER — FENTANYL CITRATE 50 UG/ML
INJECTION, SOLUTION INTRAMUSCULAR; INTRAVENOUS
Status: DISCONTINUED | OUTPATIENT
Start: 2024-09-06 | End: 2024-09-06 | Stop reason: HOSPADM

## 2024-09-06 RX ORDER — DIAZEPAM 10 MG/1
10 TABLET ORAL DAILY PRN
COMMUNITY

## 2024-09-06 RX ORDER — HYDRALAZINE HYDROCHLORIDE 20 MG/ML
10 INJECTION INTRAMUSCULAR; INTRAVENOUS EVERY 4 HOURS PRN
Status: DISCONTINUED | OUTPATIENT
Start: 2024-09-06 | End: 2024-09-06 | Stop reason: HOSPADM

## 2024-09-06 RX ORDER — HYDROCODONE BITARTRATE AND ACETAMINOPHEN 5; 325 MG/1; MG/1
1 TABLET ORAL EVERY 4 HOURS PRN
Status: DISCONTINUED | OUTPATIENT
Start: 2024-09-06 | End: 2024-09-06 | Stop reason: HOSPADM

## 2024-09-06 RX ORDER — SODIUM CHLORIDE 9 MG/ML
INJECTION, SOLUTION INTRAVENOUS ONCE
Status: DISCONTINUED | OUTPATIENT
Start: 2024-09-06 | End: 2024-09-06 | Stop reason: HOSPADM

## 2024-09-06 RX ORDER — ONDANSETRON HYDROCHLORIDE 2 MG/ML
4 INJECTION, SOLUTION INTRAVENOUS EVERY 8 HOURS PRN
Status: DISCONTINUED | OUTPATIENT
Start: 2024-09-06 | End: 2024-09-06 | Stop reason: HOSPADM

## 2024-09-06 RX ORDER — DIPHENHYDRAMINE HCL 25 MG
50 CAPSULE ORAL
Status: DISCONTINUED | OUTPATIENT
Start: 2024-09-06 | End: 2024-09-06 | Stop reason: HOSPADM

## 2024-09-06 RX ORDER — SODIUM CHLORIDE 0.9 % (FLUSH) 0.9 %
10 SYRINGE (ML) INJECTION
Status: DISCONTINUED | OUTPATIENT
Start: 2024-09-06 | End: 2024-09-06 | Stop reason: HOSPADM

## 2024-09-06 RX ORDER — SODIUM CHLORIDE 9 MG/ML
INJECTION, SOLUTION INTRAVENOUS
OUTPATIENT
Start: 2024-09-06

## 2024-09-06 RX ADMIN — DIPHENHYDRAMINE HYDROCHLORIDE 50 MG: 25 CAPSULE ORAL at 09:09

## 2024-09-06 RX ADMIN — DIAZEPAM 10 MG: 5 TABLET ORAL at 09:09

## 2024-09-06 NOTE — Clinical Note
The catheter was repositioned to the  distal suprarenal abdominal aorta. Abd AO with renal arteries.

## 2024-09-06 NOTE — DISCHARGE INSTRUCTIONS
---  **Post-Procedure Care Instructions:**    1. **Dressing and Armboard Removal:**       - Remove the dressing and armboard 24 hours after the procedure.    2. **Showering:**       - You may shower 24 hours after the procedure. Use only soap and water to clean the area.     3. **Driving:**       - Do not drive for 2 days following the procedure.    4. **Lifting Restrictions:**       - Avoid lifting anything heavier than a gallon of milk for 5 days.    5. **Submersion:**       - Do not submerge the site under water (e.g., no baths or swimming) for 5 days.    6. **Lotions and Creams:**       - Avoid using lotions, powders, or creams around the site for 5 days.    7. **Signs of Infection or Complications:**       - Go to the nearest emergency room if you experience any of the following:       - Fever       - Discharge from the site       - Redness or swelling around the site    8. **If Bleeding Occurs:**       - If the site starts to bleed, lay flat on the ground, apply firm pressure to the site, and call 911 immediately.    ---

## 2024-09-06 NOTE — CONSULTS
Nephrology Initial Consult Note    Patient Name: Jayson Diaz  Age: 78 y.o.  : 1945  MRN: [id number]  Admission Date: 2024    Reason for Consult:      Management of ESRD  No chief complaint on file.        Lonnie Spicer MD    HPI:     Jayson Diaz is a 78 y.o. male with PMH significant for ESRD who dialyzes on TTS schedule via right CV cath at [facility] in [facility].  He came into facility this a.m. for diagnostic left heart catheterization.  We are consulted for dialysis/management of ESRD.  Patient is seen in HD on dialysis machine.  He is tolerating HD well without complaints.  He denies nausea, vomiting, shortness of breath or chest pain.      Current Facility-Administered Medications   Medication Dose Route Frequency Provider Last Rate Last Admin    0.9%  NaCl infusion   Intravenous Once Lonnie Spicer MD        acetaminophen tablet 650 mg  650 mg Oral Q4H PRN Lonnie Spicer MD        diphenhydrAMINE capsule 50 mg  50 mg Oral On Call Procedure Lonnie Spicer MD   50 mg at 24 0905    fentaNYL 50 mcg/mL injection    PRN Lonnie Spicer MD   50 mcg at 24 1020    heparin (porcine) injection    PRN Lonnie Spicer MD   3,000 Units at 24 1022    hydrALAZINE injection 10 mg  10 mg Intravenous Q4H PRN Lonnie Spicer MD        hydrALAZINE injection    PRN Lonnie Spicer MD   20 mg at 24 1011    HYDROcodone-acetaminophen 5-325 mg per tablet 1 tablet  1 tablet Oral Q4H PRN Lonnie Spicer MD        iopamidoL (ISOVUE-370) injection    PRN Lonnie Spicer MD   190 mL at 24 1052    LIDOcaine (PF) 10 mg/ml (1%) injection    PRN Lonnie Spicer MD   2 mL at 24 1020    midazolam injection    PRN Lonnie Spicer MD   1 mg at 24 1021    morphine injection 2 mg  2 mg Intravenous Q4H PRN Lonnie Spicer MD        nitroGLYCERIN in dextrose 5% 200 mcg/mL IVP    PRN Lonnie Spicer MD   200 mcg at 24 1021    ondansetron injection 4 mg  4 mg  Intravenous Q8H Lonnie Dang MD        sodium chloride 0.9% flush 10 mL  10 mL Intravenous PRLonnie Estes MD        verapamiL injection    PRLonnie Estes MD   2.5 mg at 09/06/24 1021       Charity Sheldon MD    Past Medical History:   Diagnosis Date    Carotid bruit     Chronic kidney disease, unspecified     ED (erectile dysfunction)     Hypertension     Kidney stone     Prostate cancer       Past Surgical History:   Procedure Laterality Date    HERNIA REPAIR      RADICAL PROSTATECTOMY        No family history on file.  Social History     Tobacco Use    Smoking status: Former     Current packs/day: 1.50     Types: Cigarettes    Smokeless tobacco: Not on file   Substance Use Topics    Alcohol use: Not Currently     Medications Prior to Admission   Medication Sig Dispense Refill Last Dose    aspirin (ECOTRIN) 81 MG EC tablet Take 81 mg by mouth.   9/6/2024 at 0600    atorvastatin (LIPITOR) 80 MG tablet Take 80 mg by mouth once daily.   9/5/2024    cloNIDine (CATAPRES) 0.1 MG tablet Take 0.3 mg by mouth once.   9/6/2024 at 0600    cloNIDine (CATAPRES) 0.2 MG tablet Take 0.2 mg by mouth every 2 (two) hours as needed (Uncontrolled hypertension). Take if bp still high after 0.1 clonidine   9/6/2024    diazePAM (VALIUM) 10 MG Tab Take 10 mg by mouth daily as needed.   Past Week    ezetimibe (ZETIA) 10 mg tablet Take 10 mg by mouth.   9/6/2024    glimepiride (AMARYL) 1 MG tablet 1 tablet with breakfast or the first main meal of the day Orally Once a day   9/5/2024 at 0600    hydrALAZINE (APRESOLINE) 50 MG tablet Take 50 mg by mouth 1 (one) time if needed (Systolic >170).   Past Week    labetaloL (NORMODYNE) 200 MG tablet Take 200 mg by mouth 2 (two) times daily.   9/6/2024    solifenacin (VESICARE) 5 MG tablet Take 1 tablet (5 mg total) by mouth once daily. 90 tablet 3 9/5/2024     Review of patient's allergies indicates:   Allergen Reactions    B-12 dots [cyanocobalamin (vitamin b-12)]             Review  of Systems:     Comprehensive 10pt ROS negative except as noted per HPI.         Objective:       VITAL SIGNS: 24 HR MIN & MAX LAST    Temp  Min: 97.4 °F (36.3 °C)  Max: 97.4 °F (36.3 °C)  97.4 °F (36.3 °C)        BP  Min: 119/60  Max: 176/90  (!) 120/56     Pulse  Min: 57  Max: 71  (!) 58     Resp  Min: 8  Max: 19  14    SpO2  Min: 93 %  Max: 99 %  (!) 93 %      GEN:  Well appearing male in NAD  HEENT: Conjunctiva anicteric, pupils equal, MMM, OP benign  CV: RRR +S1,S2 without murmur  PULM: CTAB, unlabored  ABD: Soft, NT/ND abdomen with NABS  EXT: No cyanosis or edema  SKIN: Warm and dry  PSYCH:  Alert and oriented x3  Vascular access:  Right chest wall CV cath            Component Value Date/Time     07/10/2024 0911     01/20/2022 0540    K 5.6 (H) 07/10/2024 0911    K 4.4 01/20/2022 0540     (H) 07/10/2024 0911     (H) 01/20/2022 0540    CO2 23 07/10/2024 0911    CO2 22 (L) 01/20/2022 0540    BUN 61 (H) 07/10/2024 0911    BUN 34.9 (H) 01/20/2022 0540    CREATININE 3.92 (H) 07/10/2024 0911    CREATININE 2.12 (H) 01/20/2022 0540    CREATININE 2.22 (H) 01/19/2022 1304    CALCIUM 8.5 (L) 07/10/2024 0911    CALCIUM 8.5 (L) 01/20/2022 0540            Component Value Date/Time    WBC 7.4 01/20/2022 1058    WBC 6.2 01/20/2022 0540    HGB 10.0 (L) 01/20/2022 1058    HGB 10.2 (L) 01/20/2022 0540    HCT 31.7 (L) 01/20/2022 1058    HCT 31.6 (L) 01/20/2022 0540     01/20/2022 1058     01/20/2022 0540         No orders to display       Assessment / Plan:     1.  ESRD on MWF schedule   2. Anemia CKD   3  Hypertension   4.  History of InStent    Proceed with hemodialysis as planned.  Plan is for him to be discharged to home once his dialysis session is complete.  He can resume outpatient HD in his center on MWF schedule thereafter.        Ayde Alfred NP

## 2024-09-06 NOTE — DISCHARGE SUMMARY
[facility] - Cath Lab Services  Discharge Note  Short Stay    Procedure(s) (LRB):  Left heart cath (Left)      OUTCOME: Patient tolerated treatment/procedure well without complication and is now ready for discharge.    DISPOSITION: Home or Self Care    FINAL DIAGNOSIS: CAD    FOLLOWUP: In clinic    DISCHARGE INSTRUCTIONS: radial artery precautions    TIME SPENT ON DISCHARGE: 35 minutes

## 2024-09-06 NOTE — NURSING
09/06/24 1621   Post-Hemodialysis Assessment   Total UF (mL) 500 mL   Post-Hemodialysis Comments 2 hr 45 min   500ml net.  VSS stable.  Urinal -300ml.  right hand warm to touch.  No bleeding noted.

## 2024-09-06 NOTE — NURSING
[facility] at this time in order to receive dialysis.

## 2025-05-02 DIAGNOSIS — C61 PROSTATE CANCER: Primary | ICD-10-CM

## 2025-05-05 ENCOUNTER — CLINICAL SUPPORT (OUTPATIENT)
Dept: UROLOGY | Facility: CLINIC | Age: 80
End: 2025-05-05
Payer: MEDICARE

## 2025-05-05 DIAGNOSIS — R97.20 ELEVATED PSA: Primary | ICD-10-CM

## 2025-05-05 LAB — PSA, DIAGNOSTIC: <0.014 NG/ML (ref 0–4)

## 2025-05-05 NOTE — PROGRESS NOTES
PSA level drawn from left AC using aseptic technique with 22 g needle. Tolerated well. Appointment for PSA discussion given to patient.

## 2025-05-08 ENCOUNTER — OFFICE VISIT (OUTPATIENT)
Dept: UROLOGY | Facility: CLINIC | Age: 80
End: 2025-05-08
Payer: MEDICARE

## 2025-05-08 VITALS
WEIGHT: 164.44 LBS | SYSTOLIC BLOOD PRESSURE: 181 MMHG | DIASTOLIC BLOOD PRESSURE: 70 MMHG | BODY MASS INDEX: 24.36 KG/M2 | HEART RATE: 54 BPM | HEIGHT: 69 IN | RESPIRATION RATE: 19 BRPM

## 2025-05-08 DIAGNOSIS — R39.15 URINARY URGENCY: Primary | ICD-10-CM

## 2025-05-08 DIAGNOSIS — C61 PROSTATE CANCER: ICD-10-CM

## 2025-05-08 PROCEDURE — 99214 OFFICE O/P EST MOD 30 MIN: CPT | Mod: S$PBB,,, | Performed by: UROLOGY

## 2025-05-08 RX ORDER — CLONIDINE HYDROCHLORIDE 0.3 MG/1
0.3 TABLET ORAL 3 TIMES DAILY
COMMUNITY

## 2025-05-08 RX ORDER — ATORVASTATIN CALCIUM 80 MG/1
80 TABLET, FILM COATED ORAL
COMMUNITY

## 2025-05-08 RX ORDER — CALCIUM ACETATE 667 MG/1
667 TABLET ORAL
COMMUNITY

## 2025-05-08 RX ORDER — LISINOPRIL 40 MG/1
40 TABLET ORAL
COMMUNITY
Start: 2024-09-30

## 2025-05-08 RX ORDER — ASPIRIN 81 MG/1
81 TABLET ORAL
COMMUNITY

## 2025-05-08 RX ORDER — DILTIAZEM HYDROCHLORIDE 240 MG/1
240 CAPSULE, COATED, EXTENDED RELEASE ORAL
COMMUNITY

## 2025-05-08 RX ORDER — CHOLECALCIFEROL (VITAMIN D3) 25 MCG
2000 TABLET ORAL
COMMUNITY

## 2025-05-08 RX ORDER — FOLIC ACID/VIT B COMPLEX AND C 0.8 MG
1 TABLET ORAL
COMMUNITY
Start: 2025-04-13

## 2025-05-08 NOTE — PROGRESS NOTES
Subjective:       Patient ID: Jayson Diaz is a 79 y.o. male.    Chief Complaint: yearly psa      HPI:  79-year-old male history of radical prostatectomy in the 1990s PSA remains undetectable doing well he holds his urine he has run out of VESIcare.  Postvoid is 36 cc overall has no complaints    Past Medical History:   Past Medical History:   Diagnosis Date    Acute renal failure on dialysis     Carotid bruit     Chronic kidney disease, unspecified     ED (erectile dysfunction)     Hypertension     Kidney stone     Prostate cancer        Past Surgical Historical:   Past Surgical History:   Procedure Laterality Date    ABDOMINAL AORTOGRAPHY N/A 9/6/2024    Procedure: AORTOGRAM, ABDOMINAL;  Surgeon: Lonnie Spicer MD;  Location: [facility] CATH LAB;  Service: Cardiology;  Laterality: N/A;    ANGIOGRAM, RENAL, BILATERAL, SELECTIVE N/A 9/6/2024    Procedure: Angiogram Renal Bilateral Selective;  Surgeon: Lonnie Spicer MD;  Location: [facility] CATH LAB;  Service: Cardiology;  Laterality: N/A;    CORONARY ANGIOGRAPHY N/A 9/6/2024    Procedure: ANGIOGRAM, CORONARY ARTERY;  Surgeon: Lonnie Spicer MD;  Location: [facility] CATH LAB;  Service: Cardiology;  Laterality: N/A;    HERNIA REPAIR      LEFT HEART CATHETERIZATION Left 9/6/2024    Procedure: Left heart cath;  Surgeon: Lonnie Spicer MD;  Location: [facility] CATH LAB;  Service: Cardiology;  Laterality: Left;  CORONARY & RENAL ANGIO VIA RRA *WILL LIKELY NEED DIALYSIS POST PROCEDURE*    RADICAL PROSTATECTOMY          Medications:   Medication List with Changes/Refills   Current Medications    ASPIRIN (ECOTRIN) 81 MG EC TABLET    Take 81 mg by mouth.    ASPIRIN (ECOTRIN) 81 MG EC TABLET    Take 81 mg by mouth.    ATORVASTATIN (LIPITOR) 80 MG TABLET    Take 80 mg by mouth once daily.    ATORVASTATIN (LIPITOR) 80 MG TABLET    Take 80 mg by mouth.    CALCIUM ACETATE,PHOSPHAT BIND, (PHOSLO) 667 MG TABLET    Take 667 mg by mouth.    CLONIDINE (CATAPRES) 0.1 MG TABLET    Take 0.3 mg by mouth  once.    CLONIDINE (CATAPRES) 0.2 MG TABLET    Take 0.2 mg by mouth every 2 (two) hours as needed (Uncontrolled hypertension). Take if bp still high after 0.1 clonidine    CLONIDINE (CATAPRES) 0.3 MG TABLET    Take 0.3 mg by mouth 3 (three) times daily.    DIAZEPAM (VALIUM) 10 MG TAB    Take 10 mg by mouth daily as needed.    DILTIAZEM (CARDIZEM CD) 240 MG 24 HR CAPSULE    Take 240 mg by mouth.    DIPHENHYDRAMINE-ACETAMINOPHEN (TYLENOL PM)  MG TAB    Take 1 tablet by mouth nightly as needed.    EZETIMIBE (ZETIA) 10 MG TABLET    Take 10 mg by mouth.    GLIMEPIRIDE (AMARYL) 1 MG TABLET    1 tablet with breakfast or the first main meal of the day Orally Once a day    HYDRALAZINE (APRESOLINE) 50 MG TABLET    Take 50 mg by mouth 1 (one) time if needed (Systolic >170).    LABETALOL (NORMODYNE) 200 MG TABLET    Take 200 mg by mouth 2 (two) times daily.    LISINOPRIL (PRINIVIL,ZESTRIL) 40 MG TABLET    Take 40 mg by mouth.    SIRENA-JEFF 0.8 MG TAB    Take 1 tablet by mouth.    SOLIFENACIN (VESICARE) 5 MG TABLET    Take 1 tablet (5 mg total) by mouth once daily.    VITAMIN D (VITAMIN D3) 1000 UNITS TAB    Take 2,000 Units by mouth.        Past Social History: Social History[1]    Allergies:   Review of patient's allergies indicates:   Allergen Reactions    Cyanocobalamin (vitamin b12) Hives and Itching    B-12 dots [cyanocobalamin (vitamin b-12)]         Family History: No family history on file.     Review of Systems:  Review of Systems   Constitutional:  Negative for activity change and appetite change.   HENT:  Negative for congestion and dental problem.    Eyes:  Negative for visual disturbance.   Respiratory:  Negative for chest tightness and shortness of breath.    Cardiovascular:  Negative for chest pain.   Gastrointestinal:  Negative for abdominal distention and abdominal pain.   Genitourinary:  Negative for decreased urine volume, difficulty urinating, dysuria, enuresis, flank pain, frequency, genital sores,  hematuria, penile discharge, penile pain, penile swelling, scrotal swelling, testicular pain and urgency.   Musculoskeletal:  Negative for back pain and neck pain.   Skin:  Negative for color change.   Neurological:  Negative for dizziness.   Hematological:  Negative for adenopathy.   Psychiatric/Behavioral:  Negative for agitation, behavioral problems and confusion.        Physical Exam:  Physical Exam  Constitutional:       General: He is not in acute distress.     Appearance: He is well-developed.   HENT:      Head: Normocephalic and atraumatic.      Nose: Nose normal.   Eyes:      General: No scleral icterus.     Conjunctiva/sclera: Conjunctivae normal.      Pupils: Pupils are equal, round, and reactive to light.   Neck:      Thyroid: No thyromegaly.      Trachea: No tracheal deviation.   Cardiovascular:      Rate and Rhythm: Normal rate and regular rhythm.      Heart sounds: Normal heart sounds.   Pulmonary:      Effort: Pulmonary effort is normal. No respiratory distress.      Breath sounds: Normal breath sounds. No wheezing or rales.   Abdominal:      General: Bowel sounds are normal. There is no distension.      Palpations: Abdomen is soft.      Tenderness: There is no abdominal tenderness. There is no guarding or rebound.   Genitourinary:     Penis: Normal. No tenderness.       Prostate: Normal.   Musculoskeletal:         General: No deformity. Normal range of motion.      Cervical back: Neck supple.   Lymphadenopathy:      Cervical: No cervical adenopathy.   Skin:     General: Skin is warm and dry.      Findings: No erythema or rash.   Neurological:      Mental Status: He is alert and oriented to person, place, and time.      Cranial Nerves: No cranial nerve deficit.   Psychiatric:         Behavior: Behavior normal.         Assessment/Plan:       Problem List Items Addressed This Visit    None         79-year-old male status post prostatectomy about 32 years ago doing well PSA remains undetectable postvoid  is 36 cc will refill his VESIcare return to clinic in 1 year with PSA         [1]   Social History  Socioeconomic History    Marital status:    Tobacco Use    Smoking status: Former     Current packs/day: 1.50     Types: Cigarettes   Substance and Sexual Activity    Alcohol use: Not Currently    Drug use: Never

## 2025-05-09 DIAGNOSIS — R97.20 ELEVATED PSA: Primary | ICD-10-CM

## 2025-05-09 DIAGNOSIS — R39.15 URINARY URGENCY: Primary | ICD-10-CM

## 2025-05-09 RX ORDER — SOLIFENACIN SUCCINATE 5 MG/1
5 TABLET, FILM COATED ORAL DAILY
Qty: 90 TABLET | Refills: 3 | Status: CANCELLED | OUTPATIENT
Start: 2025-05-09 | End: 2026-05-09

## 2025-05-09 NOTE — TELEPHONE ENCOUNTER
Vesicare 5mg #90 w/ 3 refills send to [facility] in [place].     ----- Message from Jenn sent at 5/9/2025  1:00 PM CDT -----  Contact: OWEN TORRES [[id number]]  ..TYPE: Patient Requesting Refill Who Called:  OWEN TORRES [[id number]]Refill or New Rx: refill/ renewRX Name and Strength: solifenacin (VESICARE) 5 MG tabletHow is the patient currently taking it? (ex. 1XDay): Is this a 30 day or 90 day RX: 30Preferred Pharm[facility] with phone number: [id number] [address] [address]Mqrvr: [phone] Fax: [phone] or Mail Order: localOrdering Provider: Teresa the patient rather a call back or a response via [employer]?  [facility] Call Back Number: [phone] (home) Additional Information:

## 2025-05-12 RX ORDER — SOLIFENACIN SUCCINATE 5 MG/1
5 TABLET, FILM COATED ORAL DAILY
Qty: 90 TABLET | Refills: 3 | Status: SHIPPED | OUTPATIENT
Start: 2025-05-12 | End: 2026-05-12

## (undated) DEVICE — PAD DEFIB CADENCE ADULT R2

## (undated) DEVICE — GUIDEWIRE INQWIRE SE 3MM JTIP

## (undated) DEVICE — CATH EMPULSE ANGLED 5FR PIGTAI

## (undated) DEVICE — KIT GLIDESHEATH SLEND 6FR 10CM

## (undated) DEVICE — BAND TR COMP DEVICE REG 24CM

## (undated) DEVICE — CATH IMPULSE FR4 5FR 125CM

## (undated) DEVICE — CANNULA NASAL ADULT

## (undated) DEVICE — SET ANGIO ACIST CVI ANGIOTOUCH

## (undated) DEVICE — CATH OPTITORQUE RADIAL 5FR

## (undated) DEVICE — Device